# Patient Record
Sex: FEMALE | Race: WHITE
[De-identification: names, ages, dates, MRNs, and addresses within clinical notes are randomized per-mention and may not be internally consistent; named-entity substitution may affect disease eponyms.]

---

## 2017-01-13 VITALS — DIASTOLIC BLOOD PRESSURE: 73 MMHG | SYSTOLIC BLOOD PRESSURE: 164 MMHG

## 2021-12-06 ENCOUNTER — HOSPITAL ENCOUNTER (OUTPATIENT)
Dept: HOSPITAL 63 - RAD | Age: 71
End: 2021-12-06
Attending: NURSE PRACTITIONER
Payer: MEDICARE

## 2021-12-06 DIAGNOSIS — R31.9: ICD-10-CM

## 2021-12-06 DIAGNOSIS — J43.9: ICD-10-CM

## 2021-12-06 DIAGNOSIS — N32.89: ICD-10-CM

## 2021-12-06 DIAGNOSIS — K31.89: Primary | ICD-10-CM

## 2021-12-06 PROCEDURE — 74176 CT ABD & PELVIS W/O CONTRAST: CPT

## 2021-12-06 NOTE — RAD
Examination: CT of the abdomen pelvis without contrast



HISTORY: History of hematuria, abdominal cramping



COMPARISON: None available



TECHNIQUE: Axial CT images of the abdomen pelvis were performed without contrast. Coronal and sagitta
l reformats are performed



Exposure: One or more of the following individualized dose reduction techniques were utilized for thi
s examination:  1. Automated exposure control  2. Adjustment of the mA and/or kV according to patient
 size  3. Use of iterative reconstruction technique







FINDINGS:



Partially visualized calcified right breast prosthesis. Partially visualized emphysematous changes bi
lateral lungs.No evidence of free air identified in the abdomen



The evaluation of the solid organs is limited due to lack of IV contrast. The evaluation of bowel is 
limited due to lack of oral contrast. There are few scattered calcified granulomas identified in the 
liver, spleen. The adrenals grossly appears unremarkable. Cholecystectomy changes. The stomach is mil
dly distended. The visualized pancreas grossly appears unremarkable. Small bowel is nondilated. The a
ppendix is normal. Feces and gas noted in the colon.



Mild thickened appearance of the wall of the sigmoid colon. Urinary bladder is mildly distended. No e
vidence of intrarenal collecting system calculi identified. Moderate degenerative changes thoracal debra
mbar spine.



IMPRESSION:



1. Mild thickened appearance of the wall of the sigmoid colon could be nondistention or mild colitis.




2. No evidence of intrarenal collecting system calculi identified.



Electronically signed by: Donald Devi MD (12/6/2021 12:20 PM) [id number]